# Patient Record
Sex: FEMALE | Race: BLACK OR AFRICAN AMERICAN | NOT HISPANIC OR LATINO | URBAN - METROPOLITAN AREA
[De-identification: names, ages, dates, MRNs, and addresses within clinical notes are randomized per-mention and may not be internally consistent; named-entity substitution may affect disease eponyms.]

---

## 2020-06-22 ENCOUNTER — TELEPHONE ENCOUNTER (OUTPATIENT)
Dept: URBAN - METROPOLITAN AREA CLINIC 84 | Facility: CLINIC | Age: 65
End: 2020-06-22

## 2020-07-16 ENCOUNTER — CLAIMS CREATED FROM THE CLAIM WINDOW (OUTPATIENT)
Dept: URBAN - METROPOLITAN AREA TELEHEALTH 2 | Facility: TELEHEALTH | Age: 65
End: 2020-07-16
Payer: MEDICARE

## 2020-07-16 ENCOUNTER — TELEPHONE ENCOUNTER (OUTPATIENT)
Dept: URBAN - METROPOLITAN AREA CLINIC 92 | Facility: CLINIC | Age: 65
End: 2020-07-16

## 2020-07-16 DIAGNOSIS — K74.60 CIRRHOSIS: ICD-10-CM

## 2020-07-16 DIAGNOSIS — E11.9 DIABETES: ICD-10-CM

## 2020-07-16 DIAGNOSIS — K74.69 CIRRHOSIS, CRYPTOGENIC: ICD-10-CM

## 2020-07-16 DIAGNOSIS — N18.6 ESRD (END STAGE RENAL DISEASE): ICD-10-CM

## 2020-07-16 DIAGNOSIS — I81 PORTAL VEIN THROMBOSIS: ICD-10-CM

## 2020-07-16 PROCEDURE — G9903 PT SCRN TBCO ID AS NON USER: HCPCS | Performed by: INTERNAL MEDICINE

## 2020-07-16 PROCEDURE — 1036F TOBACCO NON-USER: CPT | Performed by: INTERNAL MEDICINE

## 2020-07-16 PROCEDURE — 99443 PHONE E/M BY PHYS 21-30 MIN: CPT | Performed by: INTERNAL MEDICINE

## 2020-07-16 RX ORDER — AMLODIPINE BESYLATE 10 MG/1
TAKE 1 TABLET (10 MG) BY ORAL ROUTE ONCE DAILY TABLET ORAL 1
Qty: 0 | Refills: 0 | Status: ACTIVE | COMMUNITY
Start: 1900-01-01

## 2020-07-16 RX ORDER — SITAGLIPTIN 25 MG/1
TABLET, FILM COATED ORAL
Qty: 0 | Refills: 0 | Status: ACTIVE | COMMUNITY
Start: 1900-01-01

## 2020-07-16 RX ORDER — FUROSEMIDE 40 MG/1
TABLET ORAL
Qty: 0 | Refills: 0 | Status: ACTIVE | COMMUNITY
Start: 1900-01-01

## 2020-07-16 RX ORDER — CALCITRIOL 0.25 UG/1
TAKE 1 CAPSULE (0.25 MCG) BY ORAL ROUTE ONCE DAILY CAPSULE ORAL 1
Qty: 0 | Refills: 0 | Status: ACTIVE | COMMUNITY
Start: 1900-01-01

## 2020-07-16 RX ORDER — HYDROXYZINE HYDROCHLORIDE 25 MG/1
TAKE 1 TABLET (25 MG) BY ORAL ROUTE 3 TIMES PER DAY TABLET, FILM COATED ORAL
Qty: 0 | Refills: 0 | Status: ACTIVE | COMMUNITY
Start: 1900-01-01

## 2020-07-16 RX ORDER — NADOLOL 40 MG/1
TAKE 1 TABLET (40 MG) BY ORAL ROUTE ONCE DAILY TABLET ORAL 1
Qty: 0 | Refills: 0 | Status: ACTIVE | COMMUNITY
Start: 1900-01-01

## 2020-07-16 RX ORDER — LEVOTHYROXINE SODIUM 0.2 MG/1
TAKE 1 TABLET (200 MCG) BY ORAL ROUTE ONCE DAILY TABLET ORAL 1
Qty: 0 | Refills: 0 | Status: ACTIVE | COMMUNITY
Start: 1900-01-01

## 2020-07-16 RX ORDER — ONDANSETRON 4 MG/1
PLACE 2 TABLETS (8 MG) ON TOP OF THE TONGUE WHERE THEY WILL DISSOLVE, THEN SWALLOW BY TRANSLINGUAL ROUTE 1-2 HOURS PRIOR TO RADIATION THERAPY TABLET, ORALLY DISINTEGRATING ORAL
Qty: 2 | Refills: 0 | Status: ACTIVE | COMMUNITY
Start: 1900-01-01

## 2020-07-16 RX ORDER — INSULIN DETEMIR 100 [IU]/ML
INJECT BY SUBCUTANEOUS ROUTE PER PRESCRIBER'S INSTRUCTIONS. INSULIN DOSING REQUIRES INDIVIDUALIZATION INJECTION, SOLUTION SUBCUTANEOUS
Qty: 1 | Refills: 0 | Status: ACTIVE | COMMUNITY
Start: 1900-01-01

## 2020-07-16 NOTE — HPI-TODAY'S VISIT:
Ms Lagos returns for a f/u visit conducted via telephone. She does not have any complaints. Still recovering at home following recent toe amputation. Now on regular HD. Reports she is working with PT and ambulating with assistance. Reports that her surgical wound is not completely healed. She has lost weight and her diabetes is better controlled(A1c now 7-8 per patient). I had ordered labs and an US in April but these have not been done yet. 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 No HE, bleeding, jaundice or ascites. Patient seen today via telehealth by agreement and consent of patient in light of current COVID-19 pandemic. I used a telephone call during the visit. The patient encounter is appropriate and reasonable under the circumstances given the patient's particular presentation at this time. The patient has been advised of the followin) the potential risks and limitations of this mode of treatment (including but not limited to the absence of in-person examination); 2) the right to refuse telehealth services at any point without affecting the right to future care; 3) the right to receive in-person services, included immediately after this consultation if an urgent need arises; 4) information, including identifiable images or information from this telehealth consult, will only be shared in accordance with HIPAA regulations. Any and all of the patient's and/or patient's family member's questions on this issue have been answered. The patient has verbally consented to be treated via telehealth services. The patient has also been advised to contact this office for worsening conditions or problems, and seek emergency medical treatment and/or call 911 if the patient deems either necessary.

## 2020-08-26 ENCOUNTER — TELEPHONE ENCOUNTER (OUTPATIENT)
Dept: URBAN - METROPOLITAN AREA CLINIC 92 | Facility: CLINIC | Age: 65
End: 2020-08-26

## 2020-09-08 ENCOUNTER — LAB OUTSIDE AN ENCOUNTER (OUTPATIENT)
Dept: URBAN - METROPOLITAN AREA CLINIC 48 | Facility: CLINIC | Age: 65
End: 2020-09-08

## 2020-09-08 ENCOUNTER — OFFICE VISIT (OUTPATIENT)
Dept: URBAN - METROPOLITAN AREA CLINIC 91 | Facility: CLINIC | Age: 65
End: 2020-09-08
Payer: MEDICARE

## 2020-09-08 DIAGNOSIS — K76.89 ABNORMAL FINDING ON LIVER FUNCTION: ICD-10-CM

## 2020-09-08 DIAGNOSIS — Z87.19 HISTORY OF CIRRHOSIS OF LIVER: ICD-10-CM

## 2020-09-08 PROCEDURE — 76705 ECHO EXAM OF ABDOMEN: CPT | Performed by: INTERNAL MEDICINE

## 2020-09-08 RX ORDER — SITAGLIPTIN 25 MG/1
TABLET, FILM COATED ORAL
Qty: 0 | Refills: 0 | Status: ACTIVE | COMMUNITY
Start: 1900-01-01

## 2020-09-08 RX ORDER — CALCITRIOL 0.25 UG/1
TAKE 1 CAPSULE (0.25 MCG) BY ORAL ROUTE ONCE DAILY CAPSULE ORAL 1
Qty: 0 | Refills: 0 | Status: ACTIVE | COMMUNITY
Start: 1900-01-01

## 2020-09-08 RX ORDER — NADOLOL 40 MG/1
TAKE 1 TABLET (40 MG) BY ORAL ROUTE ONCE DAILY TABLET ORAL 1
Qty: 0 | Refills: 0 | Status: ACTIVE | COMMUNITY
Start: 1900-01-01

## 2020-09-08 RX ORDER — LEVOTHYROXINE SODIUM 0.2 MG/1
TAKE 1 TABLET (200 MCG) BY ORAL ROUTE ONCE DAILY TABLET ORAL 1
Qty: 0 | Refills: 0 | Status: ACTIVE | COMMUNITY
Start: 1900-01-01

## 2020-09-08 RX ORDER — FUROSEMIDE 40 MG/1
TABLET ORAL
Qty: 0 | Refills: 0 | Status: ACTIVE | COMMUNITY
Start: 1900-01-01

## 2020-09-08 RX ORDER — AMLODIPINE BESYLATE 10 MG/1
TAKE 1 TABLET (10 MG) BY ORAL ROUTE ONCE DAILY TABLET ORAL 1
Qty: 0 | Refills: 0 | Status: ACTIVE | COMMUNITY
Start: 1900-01-01

## 2020-09-08 RX ORDER — INSULIN DETEMIR 100 [IU]/ML
INJECT BY SUBCUTANEOUS ROUTE PER PRESCRIBER'S INSTRUCTIONS. INSULIN DOSING REQUIRES INDIVIDUALIZATION INJECTION, SOLUTION SUBCUTANEOUS
Qty: 1 | Refills: 0 | Status: ACTIVE | COMMUNITY
Start: 1900-01-01

## 2020-09-08 RX ORDER — HYDROXYZINE HYDROCHLORIDE 25 MG/1
TAKE 1 TABLET (25 MG) BY ORAL ROUTE 3 TIMES PER DAY TABLET, FILM COATED ORAL
Qty: 0 | Refills: 0 | Status: ACTIVE | COMMUNITY
Start: 1900-01-01

## 2020-09-08 RX ORDER — ONDANSETRON 4 MG/1
PLACE 2 TABLETS (8 MG) ON TOP OF THE TONGUE WHERE THEY WILL DISSOLVE, THEN SWALLOW BY TRANSLINGUAL ROUTE 1-2 HOURS PRIOR TO RADIATION THERAPY TABLET, ORALLY DISINTEGRATING ORAL
Qty: 2 | Refills: 0 | Status: ACTIVE | COMMUNITY
Start: 1900-01-01

## 2020-09-10 LAB
A/G RATIO: 0.5
AFP, SERUM, TUMOR MARKER: 1.2
ALBUMIN: 2.3
ALKALINE PHOSPHATASE: 304
ALT (SGPT): 26
AST (SGOT): 48
BASO (ABSOLUTE): 0
BASOS: 1
BILIRUBIN, TOTAL: 0.7
BUN/CREATININE RATIO: 4
BUN: 17
CALCIUM: 7.3
CARBON DIOXIDE, TOTAL: 22
CHLORIDE: 99
CREATININE: 3.88
EGFR IF AFRICN AM: 13
EGFR IF NONAFRICN AM: 12
EOS (ABSOLUTE): 0.3
EOS: 4
GLOBULIN, TOTAL: 5
GLUCOSE: 235
HEMATOCRIT: 24.5
HEMATOLOGY COMMENTS:: (no result)
HEMOGLOBIN: 7.9
IMMATURE CELLS: (no result)
IMMATURE GRANS (ABS): 0
IMMATURE GRANULOCYTES: 0
INR: 1.2
LYMPHS (ABSOLUTE): 2
LYMPHS: 32
MCH: 29.9
MCHC: 32.2
MCV: 93
MONOCYTES(ABSOLUTE): 0.4
MONOCYTES: 7
NEUTROPHILS (ABSOLUTE): 3.6
NEUTROPHILS: 56
NRBC: (no result)
PLATELETS: 22
POTASSIUM: 3.5
PROTEIN, TOTAL: 7.3
PROTHROMBIN TIME: 13.1
RBC: 2.64
RDW: 14.8
SODIUM: 134
WBC: 6.3

## 2020-09-16 ENCOUNTER — OFFICE VISIT (OUTPATIENT)
Dept: URBAN - METROPOLITAN AREA TELEHEALTH 2 | Facility: TELEHEALTH | Age: 65
End: 2020-09-16
Payer: MEDICARE

## 2020-09-16 DIAGNOSIS — N18.6 ESRD (END STAGE RENAL DISEASE): ICD-10-CM

## 2020-09-16 DIAGNOSIS — K74.60 CIRRHOSIS: ICD-10-CM

## 2020-09-16 DIAGNOSIS — K76.6 PORTAL HYPERTENSION: ICD-10-CM

## 2020-09-16 DIAGNOSIS — I85.00 ESOPHAGEAL VARICES: ICD-10-CM

## 2020-09-16 PROCEDURE — 99213 OFFICE O/P EST LOW 20 MIN: CPT | Performed by: INTERNAL MEDICINE

## 2020-09-16 PROCEDURE — 99443 PHONE E/M BY PHYS 21-30 MIN: CPT | Performed by: INTERNAL MEDICINE

## 2020-09-16 RX ORDER — HYDROXYZINE HYDROCHLORIDE 25 MG/1
TAKE 1 TABLET (25 MG) BY ORAL ROUTE 3 TIMES PER DAY TABLET, FILM COATED ORAL
Qty: 0 | Refills: 0 | Status: ACTIVE | COMMUNITY
Start: 1900-01-01

## 2020-09-16 RX ORDER — NADOLOL 40 MG/1
TAKE 1 TABLET (40 MG) BY ORAL ROUTE ONCE DAILY TABLET ORAL 1
Qty: 0 | Refills: 0 | Status: ACTIVE | COMMUNITY
Start: 1900-01-01

## 2020-09-16 RX ORDER — CALCITRIOL 0.25 UG/1
TAKE 1 CAPSULE (0.25 MCG) BY ORAL ROUTE ONCE DAILY CAPSULE ORAL 1
Qty: 0 | Refills: 0 | Status: ACTIVE | COMMUNITY
Start: 1900-01-01

## 2020-09-16 RX ORDER — AMLODIPINE BESYLATE 10 MG/1
TAKE 1 TABLET (10 MG) BY ORAL ROUTE ONCE DAILY TABLET ORAL 1
Qty: 0 | Refills: 0 | Status: ACTIVE | COMMUNITY
Start: 1900-01-01

## 2020-09-16 RX ORDER — FUROSEMIDE 40 MG/1
TABLET ORAL
Qty: 0 | Refills: 0 | Status: ACTIVE | COMMUNITY
Start: 1900-01-01

## 2020-09-16 RX ORDER — LEVOTHYROXINE SODIUM 0.2 MG/1
TAKE 1 TABLET (200 MCG) BY ORAL ROUTE ONCE DAILY TABLET ORAL 1
Qty: 0 | Refills: 0 | Status: ACTIVE | COMMUNITY
Start: 1900-01-01

## 2020-09-16 RX ORDER — INSULIN DETEMIR 100 [IU]/ML
INJECT BY SUBCUTANEOUS ROUTE PER PRESCRIBER'S INSTRUCTIONS. INSULIN DOSING REQUIRES INDIVIDUALIZATION INJECTION, SOLUTION SUBCUTANEOUS
Qty: 1 | Refills: 0 | Status: ACTIVE | COMMUNITY
Start: 1900-01-01

## 2020-09-16 RX ORDER — ONDANSETRON 4 MG/1
PLACE 2 TABLETS (8 MG) ON TOP OF THE TONGUE WHERE THEY WILL DISSOLVE, THEN SWALLOW BY TRANSLINGUAL ROUTE 1-2 HOURS PRIOR TO RADIATION THERAPY TABLET, ORALLY DISINTEGRATING ORAL
Qty: 2 | Refills: 0 | Status: ACTIVE | COMMUNITY
Start: 1900-01-01

## 2020-09-16 RX ORDER — SITAGLIPTIN 25 MG/1
TABLET, FILM COATED ORAL
Qty: 0 | Refills: 0 | Status: ACTIVE | COMMUNITY
Start: 1900-01-01

## 2020-09-17 ENCOUNTER — TELEPHONE ENCOUNTER (OUTPATIENT)
Dept: URBAN - METROPOLITAN AREA CLINIC 92 | Facility: CLINIC | Age: 65
End: 2020-09-17

## 2020-10-01 ENCOUNTER — LAB OUTSIDE AN ENCOUNTER (OUTPATIENT)
Dept: URBAN - METROPOLITAN AREA CLINIC 48 | Facility: CLINIC | Age: 65
End: 2020-10-01

## 2020-10-03 LAB
HEMATOCRIT: 23.9
HEMATOLOGY COMMENTS:: (no result)
HEMOGLOBIN A1C: 6.3
HEMOGLOBIN: 7.5
MCH: 30.7
MCHC: 31.4
MCV: 98
NRBC: (no result)
PLATELETS: 40
RBC: 2.44
RDW: 18.2
WBC: 5.5

## 2020-10-05 ENCOUNTER — TELEPHONE ENCOUNTER (OUTPATIENT)
Dept: URBAN - METROPOLITAN AREA CLINIC 92 | Facility: CLINIC | Age: 65
End: 2020-10-05

## 2020-10-15 ENCOUNTER — OFFICE VISIT (OUTPATIENT)
Dept: URBAN - METROPOLITAN AREA TELEHEALTH 2 | Facility: TELEHEALTH | Age: 65
End: 2020-10-15
Payer: MEDICARE

## 2020-10-15 ENCOUNTER — TELEPHONE ENCOUNTER (OUTPATIENT)
Dept: URBAN - METROPOLITAN AREA CLINIC 92 | Facility: CLINIC | Age: 65
End: 2020-10-15

## 2020-10-15 DIAGNOSIS — K74.69 OTHER CIRRHOSIS OF LIVER: ICD-10-CM

## 2020-10-15 DIAGNOSIS — D50.8 OTHER IRON DEFICIENCY ANEMIAS: ICD-10-CM

## 2020-10-15 DIAGNOSIS — N18.6 ESRD (END STAGE RENAL DISEASE): ICD-10-CM

## 2020-10-15 PROCEDURE — 99442 PHONE E/M BY PHYS 11-20 MIN: CPT | Performed by: INTERNAL MEDICINE

## 2020-10-15 RX ORDER — NADOLOL 40 MG/1
TAKE 1 TABLET (40 MG) BY ORAL ROUTE ONCE DAILY TABLET ORAL 1
Qty: 0 | Refills: 0 | Status: ACTIVE | COMMUNITY
Start: 1900-01-01

## 2020-10-15 RX ORDER — CALCITRIOL 0.25 UG/1
TAKE 1 CAPSULE (0.25 MCG) BY ORAL ROUTE ONCE DAILY CAPSULE ORAL 1
Qty: 0 | Refills: 0 | Status: ACTIVE | COMMUNITY
Start: 1900-01-01

## 2020-10-15 RX ORDER — HYDROXYZINE HYDROCHLORIDE 25 MG/1
TAKE 1 TABLET (25 MG) BY ORAL ROUTE 3 TIMES PER DAY TABLET, FILM COATED ORAL
Qty: 0 | Refills: 0 | Status: ACTIVE | COMMUNITY
Start: 1900-01-01

## 2020-10-15 RX ORDER — SITAGLIPTIN 25 MG/1
TABLET, FILM COATED ORAL
Qty: 0 | Refills: 0 | Status: ACTIVE | COMMUNITY
Start: 1900-01-01

## 2020-10-15 RX ORDER — FUROSEMIDE 40 MG/1
TABLET ORAL
Qty: 0 | Refills: 0 | Status: ACTIVE | COMMUNITY
Start: 1900-01-01

## 2020-10-15 RX ORDER — INSULIN DETEMIR 100 [IU]/ML
INJECT BY SUBCUTANEOUS ROUTE PER PRESCRIBER'S INSTRUCTIONS. INSULIN DOSING REQUIRES INDIVIDUALIZATION INJECTION, SOLUTION SUBCUTANEOUS
Qty: 1 | Refills: 0 | Status: ACTIVE | COMMUNITY
Start: 1900-01-01

## 2020-10-15 RX ORDER — LEVOTHYROXINE SODIUM 0.2 MG/1
TAKE 1 TABLET (200 MCG) BY ORAL ROUTE ONCE DAILY TABLET ORAL 1
Qty: 0 | Refills: 0 | Status: ACTIVE | COMMUNITY
Start: 1900-01-01

## 2020-10-15 RX ORDER — ONDANSETRON 4 MG/1
PLACE 2 TABLETS (8 MG) ON TOP OF THE TONGUE WHERE THEY WILL DISSOLVE, THEN SWALLOW BY TRANSLINGUAL ROUTE 1-2 HOURS PRIOR TO RADIATION THERAPY TABLET, ORALLY DISINTEGRATING ORAL
Qty: 2 | Refills: 0 | Status: ACTIVE | COMMUNITY
Start: 1900-01-01

## 2020-10-15 RX ORDER — AMLODIPINE BESYLATE 10 MG/1
TAKE 1 TABLET (10 MG) BY ORAL ROUTE ONCE DAILY TABLET ORAL 1
Qty: 0 | Refills: 0 | Status: ACTIVE | COMMUNITY
Start: 1900-01-01

## 2020-10-15 NOTE — HPI-TODAY'S VISIT:
Ms Lagos returns for a f/u visit conducted via telephone. 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 Not on immunosuppression. Liver disease complicated by portal hypertension and esophageal varices. No ascites or HE. 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 Also has ESRD and is on HD. Previously evaluated for SLK at [facility] but declined as she was still smoking at the time and her diabetes was poorly controlled. She has since quit smoking and reports improved glycemic control. Most recent A1C was 6.3 She does not have any complaints. Last EGD was in 2019 and she had EV banding at the time. A recent US did not show any suspicious liver lesions. MELD-Na based on labs from  is 24. AFP is normal. Her platelet count was 22k recently but on repeat it was 40k. She is anemic. Last colonoscopy was 5 years ago.  She denies any bleeding or bruising.  Her functional activity is still somewhat limited since her foot amputation. She is getting a ? prosthesis soon which may help her ambulate more easily. Currently using a walker. Patient seen today via telehealth by agreement and consent of patient in light of current COVID-19 pandemic. I used a telephone call during the visit. The patient encounter is appropriate and reasonable under the circumstances given the patient's particular presentation at this time. The patient has been advised of the followin) the potential risks and limitations of this mode of treatment (including but not limited to the absence of in-person examination); 2) the right to refuse telehealth services at any point without affecting the right to future care; 3) the right to receive in-person services, included immediately after this consultation if an urgent need arises; 4) information, including identifiable images or information from this telehealth consult, will only be shared in accordance with HIPAA regulations. Any and all of the patient's and/or patient's family member's questions on this issue have been answered. The patient has verbally consented to be treated via telehealth services. The patient has also been advised to contact this office for worsening conditions or problems, and seek emergency medical treatment and/or call 911 if the patient deems either necessary.

## 2021-03-16 ENCOUNTER — OFFICE VISIT (OUTPATIENT)
Dept: URBAN - METROPOLITAN AREA CLINIC 86 | Facility: CLINIC | Age: 66
End: 2021-03-16
Payer: MEDICARE

## 2021-03-16 DIAGNOSIS — Z71.89 VACCINE COUNSELING: ICD-10-CM

## 2021-03-16 DIAGNOSIS — K74.60 CIRRHOSIS: ICD-10-CM

## 2021-03-16 DIAGNOSIS — B19.20 HCV (HEPATITIS C VIRUS): ICD-10-CM

## 2021-03-16 DIAGNOSIS — E11.9 DIABETES: ICD-10-CM

## 2021-03-16 DIAGNOSIS — N18.6 ESRD (END STAGE RENAL DISEASE): ICD-10-CM

## 2021-03-16 DIAGNOSIS — D64.9 ANEMIA: ICD-10-CM

## 2021-03-16 DIAGNOSIS — I10 HYPERTENSION: ICD-10-CM

## 2021-03-16 DIAGNOSIS — K76.6 PORTAL HYPERTENSION: ICD-10-CM

## 2021-03-16 PROCEDURE — 99214 OFFICE O/P EST MOD 30 MIN: CPT | Performed by: PHYSICIAN ASSISTANT

## 2021-03-16 RX ORDER — HYDROXYZINE HYDROCHLORIDE 25 MG/1
TAKE 1 TABLET (25 MG) BY ORAL ROUTE 3 TIMES PER DAY TABLET, FILM COATED ORAL
Qty: 0 | Refills: 0 | Status: ACTIVE | COMMUNITY
Start: 1900-01-01

## 2021-03-16 RX ORDER — INSULIN DETEMIR 100 [IU]/ML
INJECT BY SUBCUTANEOUS ROUTE PER PRESCRIBER'S INSTRUCTIONS. INSULIN DOSING REQUIRES INDIVIDUALIZATION INJECTION, SOLUTION SUBCUTANEOUS
Qty: 1 | Refills: 0 | Status: ACTIVE | COMMUNITY
Start: 1900-01-01

## 2021-03-16 RX ORDER — CALCITRIOL 0.25 UG/1
TAKE 1 CAPSULE (0.25 MCG) BY ORAL ROUTE ONCE DAILY CAPSULE ORAL 1
Qty: 0 | Refills: 0 | Status: ACTIVE | COMMUNITY
Start: 1900-01-01

## 2021-03-16 RX ORDER — NADOLOL 40 MG/1
TAKE 1 TABLET (40 MG) BY ORAL ROUTE ONCE DAILY TABLET ORAL 1
Qty: 0 | Refills: 0 | Status: ACTIVE | COMMUNITY
Start: 1900-01-01

## 2021-03-16 RX ORDER — FUROSEMIDE 40 MG/1
TABLET ORAL
Qty: 0 | Refills: 0 | Status: ACTIVE | COMMUNITY
Start: 1900-01-01

## 2021-03-16 RX ORDER — LEVOTHYROXINE SODIUM 0.2 MG/1
TAKE 1 TABLET (200 MCG) BY ORAL ROUTE ONCE DAILY TABLET ORAL 1
Qty: 0 | Refills: 0 | Status: ACTIVE | COMMUNITY
Start: 1900-01-01

## 2021-03-16 RX ORDER — ONDANSETRON 4 MG/1
PLACE 2 TABLETS (8 MG) ON TOP OF THE TONGUE WHERE THEY WILL DISSOLVE, THEN SWALLOW BY TRANSLINGUAL ROUTE 1-2 HOURS PRIOR TO RADIATION THERAPY TABLET, ORALLY DISINTEGRATING ORAL
Qty: 2 | Refills: 0 | Status: ACTIVE | COMMUNITY
Start: 1900-01-01

## 2021-03-16 RX ORDER — AMLODIPINE BESYLATE 10 MG/1
TAKE 1 TABLET (10 MG) BY ORAL ROUTE ONCE DAILY TABLET ORAL 1
Qty: 0 | Refills: 0 | Status: ACTIVE | COMMUNITY
Start: 1900-01-01

## 2021-03-16 RX ORDER — SITAGLIPTIN 25 MG/1
TABLET, FILM COATED ORAL
Qty: 0 | Refills: 0 | Status: ACTIVE | COMMUNITY
Start: 1900-01-01

## 2021-03-16 NOTE — PHYSICAL EXAM CONSTITUTIONAL:
well developed, well nourished , in no acute distress , in wheelchair-examined in wheelchair, normal communication ability

## 2021-03-16 NOTE — HPI-TODAY'S VISIT:
Previous pt of dr. alcaraz.  she stopped ursodiol last visit. need updated imaging.     Labs feb 2021: Cr elevated 5.31 Glucose 103 Albumin 2.5 Tb 1 Alp 231 Ast 40 Alt 22 Wbc 10.5, plt 37, wbc 5.8   Sept 2020 u/s: Liver somewhat coarse in echotexture, mild nodular contour. In keeping with cirrhosis. Spleen measures 10.3 cm. ivc not well seen. No liver lesion  Dr. 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 Not on immunosuppression. Liver disease complicated by portal hypertension and esophageal varices. No ascites or HE. 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 Also has ESRD and is on HD. Previously evaluated for SLK at [facility] but declined as she was still smoking at the time and her diabetes was poorly controlled. She has since quit smoking and reports improved glycemic control. Most recent A1C was 6.3 She does not have any complaints. Last EGD was in April 2019 and she had EV banding at the time. A recent US did not show any suspicious liver lesions. MELD-Na based on labs from 9/8 is 24. AFP is normal. Her platelet count was 22k recently but on repeat it was 40k. She is anemic. Last colonoscopy was 5 years ago.  She denies any bleeding or bruising.  Her functional activity is still somewhat limited since her foot amputation. She is getting a ? prosthesis soon which may help her ambulate more easily. Currently using a walker.

## 2021-04-15 ENCOUNTER — OFFICE VISIT (OUTPATIENT)
Dept: URBAN - METROPOLITAN AREA TELEHEALTH 2 | Facility: TELEHEALTH | Age: 66
End: 2021-04-15
Payer: MEDICARE

## 2021-04-15 DIAGNOSIS — K74.60 CIRRHOSIS: ICD-10-CM

## 2021-04-15 DIAGNOSIS — I10 HYPERTENSION: ICD-10-CM

## 2021-04-15 DIAGNOSIS — N18.6 ESRD (END STAGE RENAL DISEASE): ICD-10-CM

## 2021-04-15 DIAGNOSIS — D64.9 ANEMIA: ICD-10-CM

## 2021-04-15 PROCEDURE — 99442 PHONE E/M BY PHYS 11-20 MIN: CPT | Performed by: INTERNAL MEDICINE

## 2021-04-15 RX ORDER — AMLODIPINE BESYLATE 10 MG/1
TAKE 1 TABLET (10 MG) BY ORAL ROUTE ONCE DAILY TABLET ORAL 1
Qty: 0 | Refills: 0 | Status: ACTIVE | COMMUNITY
Start: 1900-01-01

## 2021-04-15 RX ORDER — INSULIN DETEMIR 100 [IU]/ML
INJECT BY SUBCUTANEOUS ROUTE PER PRESCRIBER'S INSTRUCTIONS. INSULIN DOSING REQUIRES INDIVIDUALIZATION INJECTION, SOLUTION SUBCUTANEOUS
Qty: 1 | Refills: 0 | Status: ACTIVE | COMMUNITY
Start: 1900-01-01

## 2021-04-15 RX ORDER — SITAGLIPTIN 25 MG/1
TABLET, FILM COATED ORAL
Qty: 0 | Refills: 0 | Status: ACTIVE | COMMUNITY
Start: 1900-01-01

## 2021-04-15 RX ORDER — CALCITRIOL 0.25 UG/1
TAKE 1 CAPSULE (0.25 MCG) BY ORAL ROUTE ONCE DAILY CAPSULE ORAL 1
Qty: 0 | Refills: 0 | Status: ACTIVE | COMMUNITY
Start: 1900-01-01

## 2021-04-15 RX ORDER — FUROSEMIDE 40 MG/1
TABLET ORAL
Qty: 0 | Refills: 0 | Status: ACTIVE | COMMUNITY
Start: 1900-01-01

## 2021-04-15 RX ORDER — HYDROXYZINE HYDROCHLORIDE 25 MG/1
TAKE 1 TABLET (25 MG) BY ORAL ROUTE 3 TIMES PER DAY TABLET, FILM COATED ORAL
Qty: 0 | Refills: 0 | Status: ACTIVE | COMMUNITY
Start: 1900-01-01

## 2021-04-15 RX ORDER — ONDANSETRON 4 MG/1
PLACE 2 TABLETS (8 MG) ON TOP OF THE TONGUE WHERE THEY WILL DISSOLVE, THEN SWALLOW BY TRANSLINGUAL ROUTE 1-2 HOURS PRIOR TO RADIATION THERAPY TABLET, ORALLY DISINTEGRATING ORAL
Qty: 2 | Refills: 0 | Status: ACTIVE | COMMUNITY
Start: 1900-01-01

## 2021-04-15 RX ORDER — LEVOTHYROXINE SODIUM 0.2 MG/1
TAKE 1 TABLET (200 MCG) BY ORAL ROUTE ONCE DAILY TABLET ORAL 1
Qty: 0 | Refills: 0 | Status: ACTIVE | COMMUNITY
Start: 1900-01-01

## 2021-04-15 RX ORDER — NADOLOL 40 MG/1
TAKE 1 TABLET (40 MG) BY ORAL ROUTE ONCE DAILY TABLET ORAL 1
Qty: 0 | Refills: 0 | Status: ACTIVE | COMMUNITY
Start: 1900-01-01

## 2021-04-15 NOTE — HPI-TODAY'S VISIT:
This is a 65-year-old -American female who is due for endoscopic surveillance of varices as well as colonoscopy she also was incidentally found to have an anemia.  Her bowel moods are normal there is no issues with her digestion.

## 2021-05-11 ENCOUNTER — LAB OUTSIDE AN ENCOUNTER (OUTPATIENT)
Dept: URBAN - METROPOLITAN AREA CLINIC 86 | Facility: CLINIC | Age: 66
End: 2021-05-11

## 2021-05-12 LAB
ALBUMIN: 2.7
ALKALINE PHOSPHATASE: 249
ALT (SGPT): 28
AST (SGOT): 52
BASO (ABSOLUTE): 0.1
BASOS: 1
BILIRUBIN, DIRECT: 0.47
BILIRUBIN, TOTAL: 1.1
EOS (ABSOLUTE): 0.3
EOS: 6
HEMATOCRIT: 29.4
HEMATOLOGY COMMENTS:: (no result)
HEMOGLOBIN: 9.6
IMMATURE CELLS: (no result)
IMMATURE GRANS (ABS): 0
IMMATURE GRANULOCYTES: 0
INR: 1.4
LYMPHS (ABSOLUTE): 2.1
LYMPHS: 38
MCH: 31.7
MCHC: 32.7
MCV: 97
MONOCYTES(ABSOLUTE): 0.5
MONOCYTES: 8
NEUTROPHILS (ABSOLUTE): 2.6
NEUTROPHILS: 47
NRBC: (no result)
PLATELETS: 37
PROTEIN, TOTAL: 8.1
PROTHROMBIN TIME: 14.3
RBC: 3.03
RDW: 14.3
WBC: 5.6

## 2021-05-16 ENCOUNTER — LAB OUTSIDE AN ENCOUNTER (OUTPATIENT)
Dept: URBAN - METROPOLITAN AREA CLINIC 86 | Facility: CLINIC | Age: 66
End: 2021-05-16

## 2021-05-19 ENCOUNTER — OFFICE VISIT (OUTPATIENT)
Dept: URBAN - METROPOLITAN AREA TELEHEALTH 2 | Facility: TELEHEALTH | Age: 66
End: 2021-05-19

## 2021-05-19 RX ORDER — LEVOTHYROXINE SODIUM 0.2 MG/1
TAKE 1 TABLET (200 MCG) BY ORAL ROUTE ONCE DAILY TABLET ORAL 1
Qty: 0 | Refills: 0 | Status: ACTIVE | COMMUNITY
Start: 1900-01-01

## 2021-05-19 RX ORDER — AMLODIPINE BESYLATE 10 MG/1
TAKE 1 TABLET (10 MG) BY ORAL ROUTE ONCE DAILY TABLET ORAL 1
Qty: 0 | Refills: 0 | Status: ACTIVE | COMMUNITY
Start: 1900-01-01

## 2021-05-19 RX ORDER — HYDROXYZINE HYDROCHLORIDE 25 MG/1
TAKE 1 TABLET (25 MG) BY ORAL ROUTE 3 TIMES PER DAY TABLET, FILM COATED ORAL
Qty: 0 | Refills: 0 | Status: ACTIVE | COMMUNITY
Start: 1900-01-01

## 2021-05-19 RX ORDER — FUROSEMIDE 40 MG/1
TABLET ORAL
Qty: 0 | Refills: 0 | Status: ACTIVE | COMMUNITY
Start: 1900-01-01

## 2021-05-19 RX ORDER — NADOLOL 40 MG/1
TAKE 1 TABLET (40 MG) BY ORAL ROUTE ONCE DAILY TABLET ORAL 1
Qty: 0 | Refills: 0 | Status: ACTIVE | COMMUNITY
Start: 1900-01-01

## 2021-05-19 RX ORDER — CALCITRIOL 0.25 UG/1
TAKE 1 CAPSULE (0.25 MCG) BY ORAL ROUTE ONCE DAILY CAPSULE ORAL 1
Qty: 0 | Refills: 0 | Status: ACTIVE | COMMUNITY
Start: 1900-01-01

## 2021-05-19 RX ORDER — INSULIN DETEMIR 100 [IU]/ML
INJECT BY SUBCUTANEOUS ROUTE PER PRESCRIBER'S INSTRUCTIONS. INSULIN DOSING REQUIRES INDIVIDUALIZATION INJECTION, SOLUTION SUBCUTANEOUS
Qty: 1 | Refills: 0 | Status: ACTIVE | COMMUNITY
Start: 1900-01-01

## 2021-05-19 RX ORDER — ONDANSETRON 4 MG/1
PLACE 2 TABLETS (8 MG) ON TOP OF THE TONGUE WHERE THEY WILL DISSOLVE, THEN SWALLOW BY TRANSLINGUAL ROUTE 1-2 HOURS PRIOR TO RADIATION THERAPY TABLET, ORALLY DISINTEGRATING ORAL
Qty: 2 | Refills: 0 | Status: ACTIVE | COMMUNITY
Start: 1900-01-01

## 2021-05-19 RX ORDER — SITAGLIPTIN 25 MG/1
TABLET, FILM COATED ORAL
Qty: 0 | Refills: 0 | Status: ACTIVE | COMMUNITY
Start: 1900-01-01

## 2021-05-19 NOTE — HPI-TODAY'S VISIT:
PT DID NOT  EITHER NUMBERS. UNABLE TO LEAVE MESSAGE ON CELL-VMAIL NOT SET UP.    Previous pt of dr. alcaraz.  she stopped ursodiol last visit. need updated imaging.     Labs feb 2021: Cr elevated 5.31 Glucose 103 Albumin 2.5 Tb 1 Alp 231 Ast 40 Alt 22 Wbc 10.5, plt 37, wbc 5.8   Sept 2020 u/s: Liver somewhat coarse in echotexture, mild nodular contour. In keeping with cirrhosis. Spleen measures 10.3 cm. ivc not well seen. No liver lesion  Dr. 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 Not on immunosuppression. Liver disease complicated by portal hypertension and esophageal varices. No ascites or HE. 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 Also has ESRD and is on HD. Previously evaluated for SLK at [facility] but declined as she was still smoking at the time and her diabetes was poorly controlled. She has since quit smoking and reports improved glycemic control. Most recent A1C was 6.3 She does not have any complaints. Last EGD was in April 2019 and she had EV banding at the time. A recent US did not show any suspicious liver lesions. MELD-Na based on labs from 9/8 is 24. AFP is normal. Her platelet count was 22k recently but on repeat it was 40k. She is anemic. Last colonoscopy was 5 years ago.  She denies any bleeding or bruising.  Her functional activity is still somewhat limited since her foot amputation. She is getting a ? prosthesis soon which may help her ambulate more easily. Currently using a walker. Previous pt of dr. alcaraz.  she stopped ursodiol last visit. need updated imaging.     Labs feb 2021: Cr elevated 5.31 Glucose 103 Albumin 2.5 Tb 1 Alp 231 Ast 40 Alt 22 Wbc 10.5, plt 37, wbc 5.8   Sept 2020 u/s: Liver somewhat coarse in echotexture, mild nodular contour. In keeping with cirrhosis. Spleen measures 10.3 cm. ivc not well seen. No liver lesion  Dr. 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 Not on immunosuppression. Liver disease complicated by portal hypertension and esophageal varices. No ascites or HE. 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 Also has ESRD and is on HD. Previously evaluated for SLK at [facility] but declined as she was still smoking at the time and her diabetes was poorly controlled. She has since quit smoking and reports improved glycemic control. Most recent A1C was 6.3 She does not have any complaints. Last EGD was in April 2019 and she had EV banding at the time. A recent US did not show any suspicious liver lesions. MELD-Na based on labs from 9/8 is 24. AFP is normal. Her platelet count was 22k recently but on repeat it was 40k. She is anemic. Last colonoscopy was 5 years ago.  She denies any bleeding or bruising.  Her functional activity is still somewhat limited since her foot amputation. She is getting a ? prosthesis soon which may help her ambulate more easily. Currently using a walker.

## 2021-05-20 ENCOUNTER — OFFICE VISIT (OUTPATIENT)
Dept: URBAN - METROPOLITAN AREA TELEHEALTH 2 | Facility: TELEHEALTH | Age: 66
End: 2021-05-20
Payer: MEDICARE

## 2021-05-20 DIAGNOSIS — N18.6 ESRD (END STAGE RENAL DISEASE): ICD-10-CM

## 2021-05-20 DIAGNOSIS — K74.69 CIRRHOSIS, CRYPTOGENIC: ICD-10-CM

## 2021-05-20 DIAGNOSIS — Z71.89 VACCINE COUNSELING: ICD-10-CM

## 2021-05-20 DIAGNOSIS — K74.60 CIRRHOSIS: ICD-10-CM

## 2021-05-20 DIAGNOSIS — I10 HYPERTENSION: ICD-10-CM

## 2021-05-20 DIAGNOSIS — E11.9 DIABETES: ICD-10-CM

## 2021-05-20 DIAGNOSIS — D64.9 ANEMIA: ICD-10-CM

## 2021-05-20 DIAGNOSIS — Z86.19 HISTORY OF HEPATITIS C: ICD-10-CM

## 2021-05-20 DIAGNOSIS — K76.6 PORTAL HYPERTENSION: ICD-10-CM

## 2021-05-20 PROBLEM — 409063005 COUNSELING: Status: ACTIVE | Noted: 2021-04-28

## 2021-05-20 PROBLEM — 271737000 ANEMIA: Status: ACTIVE | Noted: 2021-04-28

## 2021-05-20 PROBLEM — 46177005 END STAGE RENAL DISEASE: Status: ACTIVE | Noted: 2020-10-15

## 2021-05-20 PROCEDURE — 99442 PHONE E/M BY PHYS 11-20 MIN: CPT | Performed by: PHYSICIAN ASSISTANT

## 2021-05-20 RX ORDER — INSULIN DETEMIR 100 [IU]/ML
INJECT BY SUBCUTANEOUS ROUTE PER PRESCRIBER'S INSTRUCTIONS. INSULIN DOSING REQUIRES INDIVIDUALIZATION INJECTION, SOLUTION SUBCUTANEOUS
Qty: 1 | Refills: 0 | Status: ACTIVE | COMMUNITY
Start: 1900-01-01

## 2021-05-20 RX ORDER — NADOLOL 40 MG/1
TAKE 1 TABLET (40 MG) BY ORAL ROUTE ONCE DAILY TABLET ORAL 1
Qty: 0 | Refills: 0 | Status: ACTIVE | COMMUNITY
Start: 1900-01-01

## 2021-05-20 RX ORDER — SITAGLIPTIN 25 MG/1
TABLET, FILM COATED ORAL
Qty: 0 | Refills: 0 | Status: ACTIVE | COMMUNITY
Start: 1900-01-01

## 2021-05-20 RX ORDER — AMLODIPINE BESYLATE 10 MG/1
TAKE 1 TABLET (10 MG) BY ORAL ROUTE ONCE DAILY TABLET ORAL 1
Qty: 0 | Refills: 0 | Status: ACTIVE | COMMUNITY
Start: 1900-01-01

## 2021-05-20 RX ORDER — CALCITRIOL 0.25 UG/1
TAKE 1 CAPSULE (0.25 MCG) BY ORAL ROUTE ONCE DAILY CAPSULE ORAL 1
Qty: 0 | Refills: 0 | Status: ACTIVE | COMMUNITY
Start: 1900-01-01

## 2021-05-20 RX ORDER — ONDANSETRON 4 MG/1
PLACE 2 TABLETS (8 MG) ON TOP OF THE TONGUE WHERE THEY WILL DISSOLVE, THEN SWALLOW BY TRANSLINGUAL ROUTE 1-2 HOURS PRIOR TO RADIATION THERAPY TABLET, ORALLY DISINTEGRATING ORAL
Qty: 2 | Refills: 0 | Status: ACTIVE | COMMUNITY
Start: 1900-01-01

## 2021-05-20 RX ORDER — FUROSEMIDE 40 MG/1
TABLET ORAL
Qty: 0 | Refills: 0 | Status: ACTIVE | COMMUNITY
Start: 1900-01-01

## 2021-05-20 RX ORDER — HYDROXYZINE HYDROCHLORIDE 25 MG/1
TAKE 1 TABLET (25 MG) BY ORAL ROUTE 3 TIMES PER DAY TABLET, FILM COATED ORAL
Qty: 0 | Refills: 0 | Status: ACTIVE | COMMUNITY
Start: 1900-01-01

## 2021-05-20 RX ORDER — LEVOTHYROXINE SODIUM 0.2 MG/1
TAKE 1 TABLET (200 MCG) BY ORAL ROUTE ONCE DAILY TABLET ORAL 1
Qty: 0 | Refills: 0 | Status: ACTIVE | COMMUNITY
Start: 1900-01-01

## 2021-05-20 NOTE — HPI-TODAY'S VISIT:
did telephone visit   Previous pt of dr. alcaraz.  she stopped ursodiol last visit. need updated imaging-will do it when she comes for egd/colon with dr. herrera on 6/1. per pt she hasn't received a call from [place] renal transplant team and will send referral again.  has hx of cirrhosis and understands she needs tumor screening at least every 6 months.     Labs feb 2021: Cr elevated 5.31 Glucose 103 Albumin 2.5 Tb 1 Alp 231 Ast 40 Alt 22 Wbc 10.5, plt 37, wbc 5.8   Sept 2020 u/s: Liver somewhat coarse in echotexture, mild nodular contour. In keeping with cirrhosis. Spleen measures 10.3 cm. ivc not well seen. No liver lesion  Dr. 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 Not on immunosuppression. Liver disease complicated by portal hypertension and esophageal varices. No ascites or HE. 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 Also has ESRD and is on HD. Previously evaluated for SLK at [facility] but declined as she was still smoking at the time and her diabetes was poorly controlled. She has since quit smoking and reports improved glycemic control. Most recent A1C was 6.3 She does not have any complaints. Last EGD was in April 2019 and she had EV banding at the time. A recent US did not show any suspicious liver lesions. MELD-Na based on labs from 9/8 is 24. AFP is normal. Her platelet count was 22k recently but on repeat it was 40k. She is anemic. Last colonoscopy was 5 years ago.  She denies any bleeding or bruising.  Her functional activity is still somewhat limited since her foot amputation. She is getting a ? prosthesis soon which may help her ambulate more easily. Currently using a walker. Previous pt of dr. alcaraz.  she stopped ursodiol last visit. need updated imaging.     Labs feb 2021: Cr elevated 5.31 Glucose 103 Albumin 2.5 Tb 1 Alp 231 Ast 40 Alt 22 Wbc 10.5, plt 37, wbc 5.8   Sept 2020 u/s: Liver somewhat coarse in echotexture, mild nodular contour. In keeping with cirrhosis. Spleen measures 10.3 cm. ivc not well seen. No liver lesion  Dr. Alcaraz note:  She has cirrhosis from NERIQUEZ/AIH. Not on immunosuppression. Liver disease complicated by portal hypertension and esophageal varices. No ascites or HE. 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 Also has ESRD and is on HD. Previously evaluated for SLK at [facility] but declined as she was still smoking at the time and her diabetes was poorly controlled. She has since quit smoking and reports improved glycemic control. Most recent A1C was 6.3 She does not have any complaints. Last EGD was in April 2019 and she had EV banding at the time. A recent US did not show any suspicious liver lesions. MELD-Na based on labs from 9/8 is 24. AFP is normal. Her platelet count was 22k recently but on repeat it was 40k. She is anemic. Last colonoscopy was 5 years ago.  She denies any bleeding or bruising.  Her functional activity is still somewhat limited since her foot amputation. She is getting a ? prosthesis soon which may help her ambulate more easily. Currently using a walker.

## 2021-06-01 ENCOUNTER — OFFICE VISIT (OUTPATIENT)
Dept: URBAN - METROPOLITAN AREA SURGERY CENTER 16 | Facility: SURGERY CENTER | Age: 66
End: 2021-06-01
Payer: MEDICARE

## 2021-06-01 DIAGNOSIS — K31.89 ACQUIRED DEFORMITY OF DUODENUM: ICD-10-CM

## 2021-06-01 DIAGNOSIS — Z53.8 FAILED ATTEMPTED SURGICAL PROCEDURE: ICD-10-CM

## 2021-06-01 DIAGNOSIS — I85.00 ESOPHAGEAL  VARICOSE VEINS: ICD-10-CM

## 2021-06-01 DIAGNOSIS — Z12.11 COLON CANCER SCREENING: ICD-10-CM

## 2021-06-01 PROCEDURE — 43235 EGD DIAGNOSTIC BRUSH WASH: CPT | Performed by: INTERNAL MEDICINE

## 2021-06-01 PROCEDURE — G8907 PT DOC NO EVENTS ON DISCHARG: HCPCS | Performed by: INTERNAL MEDICINE

## 2021-06-01 PROCEDURE — G0121 COLON CA SCRN NOT HI RSK IND: HCPCS | Performed by: INTERNAL MEDICINE

## 2021-06-01 RX ORDER — NADOLOL 40 MG/1
TAKE 1 TABLET (40 MG) BY ORAL ROUTE ONCE DAILY TABLET ORAL 1
Qty: 0 | Refills: 0 | Status: ACTIVE | COMMUNITY
Start: 1900-01-01

## 2021-06-01 RX ORDER — HYDROXYZINE HYDROCHLORIDE 25 MG/1
TAKE 1 TABLET (25 MG) BY ORAL ROUTE 3 TIMES PER DAY TABLET, FILM COATED ORAL
Qty: 0 | Refills: 0 | Status: ACTIVE | COMMUNITY
Start: 1900-01-01

## 2021-06-01 RX ORDER — LEVOTHYROXINE SODIUM 0.2 MG/1
TAKE 1 TABLET (200 MCG) BY ORAL ROUTE ONCE DAILY TABLET ORAL 1
Qty: 0 | Refills: 0 | Status: ACTIVE | COMMUNITY
Start: 1900-01-01

## 2021-06-01 RX ORDER — FUROSEMIDE 40 MG/1
TABLET ORAL
Qty: 0 | Refills: 0 | Status: ACTIVE | COMMUNITY
Start: 1900-01-01

## 2021-06-01 RX ORDER — ONDANSETRON 4 MG/1
PLACE 2 TABLETS (8 MG) ON TOP OF THE TONGUE WHERE THEY WILL DISSOLVE, THEN SWALLOW BY TRANSLINGUAL ROUTE 1-2 HOURS PRIOR TO RADIATION THERAPY TABLET, ORALLY DISINTEGRATING ORAL
Qty: 2 | Refills: 0 | Status: ACTIVE | COMMUNITY
Start: 1900-01-01

## 2021-06-01 RX ORDER — AMLODIPINE BESYLATE 10 MG/1
TAKE 1 TABLET (10 MG) BY ORAL ROUTE ONCE DAILY TABLET ORAL 1
Qty: 0 | Refills: 0 | Status: ACTIVE | COMMUNITY
Start: 1900-01-01

## 2021-06-01 RX ORDER — SITAGLIPTIN 25 MG/1
TABLET, FILM COATED ORAL
Qty: 0 | Refills: 0 | Status: ACTIVE | COMMUNITY
Start: 1900-01-01

## 2021-06-01 RX ORDER — CALCITRIOL 0.25 UG/1
TAKE 1 CAPSULE (0.25 MCG) BY ORAL ROUTE ONCE DAILY CAPSULE ORAL 1
Qty: 0 | Refills: 0 | Status: ACTIVE | COMMUNITY
Start: 1900-01-01

## 2021-06-01 RX ORDER — INSULIN DETEMIR 100 [IU]/ML
INJECT BY SUBCUTANEOUS ROUTE PER PRESCRIBER'S INSTRUCTIONS. INSULIN DOSING REQUIRES INDIVIDUALIZATION INJECTION, SOLUTION SUBCUTANEOUS
Qty: 1 | Refills: 0 | Status: ACTIVE | COMMUNITY
Start: 1900-01-01

## 2021-06-04 ENCOUNTER — OFFICE VISIT (OUTPATIENT)
Dept: URBAN - METROPOLITAN AREA CLINIC 91 | Facility: CLINIC | Age: 66
End: 2021-06-04

## 2021-07-20 ENCOUNTER — DASHBOARD ENCOUNTERS (OUTPATIENT)
Age: 66
End: 2021-07-20

## 2021-07-21 ENCOUNTER — OFFICE VISIT (OUTPATIENT)
Dept: URBAN - METROPOLITAN AREA TELEHEALTH 2 | Facility: TELEHEALTH | Age: 66
End: 2021-07-21

## 2021-07-21 RX ORDER — ONDANSETRON 4 MG/1
PLACE 2 TABLETS (8 MG) ON TOP OF THE TONGUE WHERE THEY WILL DISSOLVE, THEN SWALLOW BY TRANSLINGUAL ROUTE 1-2 HOURS PRIOR TO RADIATION THERAPY TABLET, ORALLY DISINTEGRATING ORAL
Qty: 2 | Refills: 0 | Status: ACTIVE | COMMUNITY
Start: 1900-01-01

## 2021-07-21 RX ORDER — LEVOTHYROXINE SODIUM 0.2 MG/1
TAKE 1 TABLET (200 MCG) BY ORAL ROUTE ONCE DAILY TABLET ORAL 1
Qty: 0 | Refills: 0 | Status: ACTIVE | COMMUNITY
Start: 1900-01-01

## 2021-07-21 RX ORDER — AMLODIPINE BESYLATE 10 MG/1
TAKE 1 TABLET (10 MG) BY ORAL ROUTE ONCE DAILY TABLET ORAL 1
Qty: 0 | Refills: 0 | Status: ACTIVE | COMMUNITY
Start: 1900-01-01

## 2021-07-21 RX ORDER — FUROSEMIDE 40 MG/1
TABLET ORAL
Qty: 0 | Refills: 0 | Status: ACTIVE | COMMUNITY
Start: 1900-01-01

## 2021-07-21 RX ORDER — HYDROXYZINE HYDROCHLORIDE 25 MG/1
TAKE 1 TABLET (25 MG) BY ORAL ROUTE 3 TIMES PER DAY TABLET, FILM COATED ORAL
Qty: 0 | Refills: 0 | Status: ACTIVE | COMMUNITY
Start: 1900-01-01

## 2021-07-21 RX ORDER — INSULIN DETEMIR 100 [IU]/ML
INJECT BY SUBCUTANEOUS ROUTE PER PRESCRIBER'S INSTRUCTIONS. INSULIN DOSING REQUIRES INDIVIDUALIZATION INJECTION, SOLUTION SUBCUTANEOUS
Qty: 1 | Refills: 0 | Status: ACTIVE | COMMUNITY
Start: 1900-01-01

## 2021-07-21 RX ORDER — SITAGLIPTIN 25 MG/1
TABLET, FILM COATED ORAL
Qty: 0 | Refills: 0 | Status: ACTIVE | COMMUNITY
Start: 1900-01-01

## 2021-07-21 RX ORDER — CALCITRIOL 0.25 UG/1
TAKE 1 CAPSULE (0.25 MCG) BY ORAL ROUTE ONCE DAILY CAPSULE ORAL 1
Qty: 0 | Refills: 0 | Status: ACTIVE | COMMUNITY
Start: 1900-01-01

## 2021-07-21 RX ORDER — NADOLOL 40 MG/1
TAKE 1 TABLET (40 MG) BY ORAL ROUTE ONCE DAILY TABLET ORAL 1
Qty: 0 | Refills: 0 | Status: ACTIVE | COMMUNITY
Start: 1900-01-01

## 2021-07-21 NOTE — HPI-TODAY'S VISIT:
NEED TO RESCHEDULE FOR 2-3 WEEKS, PT BEING D/C FROM HOSPITAL TODAY DUE TO ELEVATED AMMONIA LEVELS. NEED RECORDS FROM [facility]. ALSO NEEDS HELP SCHEDULING U/S, SHE NO SHOWED FOR JUNE APPT.   did telephone visit.  Has not done [place] u/s.   Previous pt of dr. alcaraz.  she stopped ursodiol last visit. need updated imaging-will do it when she comes for egd/colon with dr. herrera on 6/1. per pt she hasn't received a call from [place] renal transplant team and will send referral again.  has hx of cirrhosis and understands she needs tumor screening at least every 6 months.     Labs feb 2021: Cr elevated 5.31 Glucose 103 Albumin 2.5 Tb 1 Alp 231 Ast 40 Alt 22 Wbc 10.5, plt 37, wbc 5.8   Sept 2020 u/s: Liver somewhat coarse in echotexture, mild nodular contour. In keeping with cirrhosis. Spleen measures 10.3 cm. ivc not well seen. No liver lesion  Dr. 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 Not on immunosuppression. Liver disease complicated by portal hypertension and esophageal varices. No ascites or HE. 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 Also has ESRD and is on HD. Previously evaluated for SLK at [facility] but declined as she was still smoking at the time and her diabetes was poorly controlled. She has since quit smoking and reports improved glycemic control. Most recent A1C was 6.3 She does not have any complaints. Last EGD was in April 2019 and she had EV banding at the time. A recent US did not show any suspicious liver lesions. MELD-Na based on labs from 9/8 is 24. AFP is normal. Her platelet count was 22k recently but on repeat it was 40k. She is anemic. Last colonoscopy was 5 years ago.  She denies any bleeding or bruising.  Her functional activity is still somewhat limited since her foot amputation. She is getting a ? prosthesis soon which may help her ambulate more easily. Currently using a walker. Previous pt of dr. alcaraz.  she stopped ursodiol last visit. need updated imaging.     Labs feb 2021: Cr elevated 5.31 Glucose 103 Albumin 2.5 Tb 1 Alp 231 Ast 40 Alt 22 Wbc 10.5, plt 37, wbc 5.8   Sept 2020 u/s: Liver somewhat coarse in echotexture, mild nodular contour. In keeping with cirrhosis. Spleen measures 10.3 cm. ivc not well seen. No liver lesion  Dr. 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 Not on immunosuppression. Liver disease complicated by portal hypertension and esophageal varices. No ascites or HE. 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 Also has ESRD and is on HD. Previously evaluated for SLK at [facility] but declined as she was still smoking at the time and her diabetes was poorly controlled. She has since quit smoking and reports improved glycemic control. Most recent A1C was 6.3 She does not have any complaints. Last EGD was in April 2019 and she had EV banding at the time. A recent US did not show any suspicious liver lesions. MELD-Na based on labs from 9/8 is 24. AFP is normal. Her platelet count was 22k recently but on repeat it was 40k. She is anemic. Last colonoscopy was 5 years ago.  She denies any bleeding or bruising.  Her functional activity is still somewhat limited since her foot amputation. She is getting a ? prosthesis soon which may help her ambulate more easily. Currently using a walker.

## 2021-07-22 PROBLEM — 255417007 CIRRHOTIC: Status: ACTIVE | Noted: 2020-10-05

## 2021-07-22 PROBLEM — 34742003 PORTAL HYPERTENSION: Status: ACTIVE | Noted: 2020-09-17

## 2021-07-28 ENCOUNTER — OFFICE VISIT (OUTPATIENT)
Dept: URBAN - METROPOLITAN AREA TELEHEALTH 2 | Facility: TELEHEALTH | Age: 66
End: 2021-07-28

## 2021-08-31 ENCOUNTER — OFFICE VISIT (OUTPATIENT)
Dept: URBAN - METROPOLITAN AREA CLINIC 86 | Facility: CLINIC | Age: 66
End: 2021-08-31

## 2021-08-31 ENCOUNTER — OFFICE VISIT (OUTPATIENT)
Dept: URBAN - METROPOLITAN AREA CLINIC 91 | Facility: CLINIC | Age: 66
End: 2021-08-31
